# Patient Record
Sex: MALE
[De-identification: names, ages, dates, MRNs, and addresses within clinical notes are randomized per-mention and may not be internally consistent; named-entity substitution may affect disease eponyms.]

---

## 2020-12-10 ENCOUNTER — TRANSCRIPTION ENCOUNTER (OUTPATIENT)
Age: 25
End: 2020-12-10

## 2021-01-04 ENCOUNTER — TRANSCRIPTION ENCOUNTER (OUTPATIENT)
Age: 26
End: 2021-01-04

## 2021-04-08 ENCOUNTER — TRANSCRIPTION ENCOUNTER (OUTPATIENT)
Age: 26
End: 2021-04-08

## 2021-09-03 ENCOUNTER — TRANSCRIPTION ENCOUNTER (OUTPATIENT)
Age: 26
End: 2021-09-03

## 2024-02-09 ENCOUNTER — APPOINTMENT (OUTPATIENT)
Dept: ORTHOPEDIC SURGERY | Facility: CLINIC | Age: 29
End: 2024-02-09
Payer: COMMERCIAL

## 2024-02-09 VITALS — WEIGHT: 215 LBS | HEIGHT: 72 IN | BODY MASS INDEX: 29.12 KG/M2

## 2024-02-09 DIAGNOSIS — S73.101A UNSPECIFIED SPRAIN OF RIGHT HIP, INITIAL ENCOUNTER: ICD-10-CM

## 2024-02-09 DIAGNOSIS — Z83.3 FAMILY HISTORY OF DIABETES MELLITUS: ICD-10-CM

## 2024-02-09 DIAGNOSIS — Z78.9 OTHER SPECIFIED HEALTH STATUS: ICD-10-CM

## 2024-02-09 PROBLEM — Z00.00 ENCOUNTER FOR PREVENTIVE HEALTH EXAMINATION: Status: ACTIVE | Noted: 2024-02-09

## 2024-02-09 PROCEDURE — 73503 X-RAY EXAM HIP UNI 4/> VIEWS: CPT | Mod: RT

## 2024-02-09 PROCEDURE — 99204 OFFICE O/P NEW MOD 45 MIN: CPT

## 2024-02-09 PROCEDURE — 72100 X-RAY EXAM L-S SPINE 2/3 VWS: CPT

## 2024-02-09 RX ORDER — PIROXICAM 20 MG/1
20 CAPSULE ORAL
Qty: 30 | Refills: 4 | Status: ACTIVE | COMMUNITY
Start: 2024-02-09 | End: 1900-01-01

## 2024-02-09 NOTE — HISTORY OF PRESENT ILLNESS
[6] : 6 [0] : 0 [Dull/Aching] : dull/aching [Intermittent] : intermittent [Nothing helps with pain getting better] : Nothing helps with pain getting better [Sitting] : sitting [Full time] : Work status: full time [de-identified] : 2/9/24  Initial visit for this 29 year old male  complaining of spontaneous onset of rt sided hip and LBP x last 1 years duration. Worse sitting long periods and exercises such as squatting and leg press, and hip abduction. Denies a limp. Takes no nsaids. 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

## 2024-02-09 NOTE — PHYSICAL EXAM
[Normal Mood and Affect] : normal mood and affect [Able to Communicate] : able to communicate [Well Developed] : well developed [Well Nourished] : well nourished [NL (90)] : forward flexion 90 degrees [NL (30)] : right lateral bending 30 degrees [Right] : right hip [5___] : abduction 5[unfilled]/5 [de-identified] : athletic [Disc space narrowing] : Disc space narrowing [FreeTextEntry1] : slight DSN at L5-S1 only [] : non-antalgic [AP] : anteroposterior [Lateral] : lateral [There are no fractures, subluxations or dislocations. No significant abnormalities are seen] : There are no fractures, subluxations or dislocations. No significant abnormalities are seen [FreeTextEntry9] : has a OS acetabuli rt hip only

## 2024-02-09 NOTE — PLAN
[TextEntry] : The patient was advised of the diagnosis. The natural history of the pathology was explained in full to the patient in layman's terms. All questions were answered. The risks and benefits of surgical and non-surgical treatment alternatives were explained in full to the patient.  Patient is being referred for physical therapy for various modalities.  Start  Feldene daily  Will obtain an MRI rt hip

## 2024-02-12 ENCOUNTER — APPOINTMENT (OUTPATIENT)
Dept: MRI IMAGING | Facility: CLINIC | Age: 29
End: 2024-02-12
Payer: COMMERCIAL

## 2024-02-12 PROCEDURE — 73721 MRI JNT OF LWR EXTRE W/O DYE: CPT | Mod: RT

## 2024-02-15 ENCOUNTER — APPOINTMENT (OUTPATIENT)
Dept: ORTHOPEDIC SURGERY | Facility: CLINIC | Age: 29
End: 2024-02-15
Payer: COMMERCIAL

## 2024-02-15 VITALS — BODY MASS INDEX: 29.12 KG/M2 | WEIGHT: 215 LBS | HEIGHT: 72 IN

## 2024-02-15 DIAGNOSIS — M24.159 OTHER ARTICULAR CARTILAGE DISORDERS, UNSPECIFIED HIP: ICD-10-CM

## 2024-02-15 DIAGNOSIS — M16.11 UNILATERAL PRIMARY OSTEOARTHRITIS, RIGHT HIP: ICD-10-CM

## 2024-02-15 PROCEDURE — 99213 OFFICE O/P EST LOW 20 MIN: CPT

## 2024-02-15 NOTE — PHYSICAL EXAM
[Normal Mood and Affect] : normal mood and affect [Able to Communicate] : able to communicate [Well Developed] : well developed [Well Nourished] : well nourished [NL (90)] : forward flexion 90 degrees [NL (30)] : right lateral bending 30 degrees [Disc space narrowing] : Disc space narrowing [Right] : right hip [5___] : abduction 5[unfilled]/5 [AP] : anteroposterior [Lateral] : lateral [There are no fractures, subluxations or dislocations. No significant abnormalities are seen] : There are no fractures, subluxations or dislocations. No significant abnormalities are seen [de-identified] : athletic [FreeTextEntry1] : slight DSN at L5-S1 only [] : non-antalgic [FreeTextEntry9] : has a OS acetabuli rt hip only

## 2024-02-15 NOTE — PLAN
[TextEntry] : The patient was advised of the diagnosis. The natural history of the pathology was explained in full to the patient in layman's terms. All questions were answered. The risks and benefits of surgical and non-surgical treatment alternatives were explained in full to the patient.  Patient is being referred for physical therapy for various modalities.  Pt wishes to defer any surgical considerations for now.

## 2024-02-15 NOTE — HISTORY OF PRESENT ILLNESS
[6] : 6 [0] : 0 [Dull/Aching] : dull/aching [Intermittent] : intermittent [Nothing helps with pain getting better] : Nothing helps with pain getting better [Sitting] : sitting [Full time] : Work status: full time [de-identified] : 02/15/24:  Returns today for right hip MRI results.Still c/o some rt groin and rt LBP.  IMPRESSION:  RIGHT 1.  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 2.  Gluteal tendinopathy and low grade tear anterior greater trochanter with soft tissue edema and no bursitis.  2/9/24  Initial visit for this 29 year old male  complaining of spontaneous onset of rt sided hip and LBP x last 1 years duration. Worse sitting long periods and exercises such as squatting and leg press, and hip abduction. Denies a limp. Takes no nsaids. 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

## 2025-01-24 ENCOUNTER — EMERGENCY (EMERGENCY)
Facility: HOSPITAL | Age: 30
LOS: 1 days | Discharge: HOME | End: 2025-01-24
Attending: STUDENT IN AN ORGANIZED HEALTH CARE EDUCATION/TRAINING PROGRAM | Admitting: STUDENT IN AN ORGANIZED HEALTH CARE EDUCATION/TRAINING PROGRAM
Payer: COMMERCIAL

## 2025-01-24 VITALS
OXYGEN SATURATION: 98 % | DIASTOLIC BLOOD PRESSURE: 75 MMHG | HEIGHT: 71 IN | RESPIRATION RATE: 18 BRPM | WEIGHT: 205.03 LBS | SYSTOLIC BLOOD PRESSURE: 124 MMHG | HEART RATE: 73 BPM | TEMPERATURE: 99 F
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
## 2025-01-24 NOTE — ED ADULT NURSE NOTE - CHPI ED NUR SYMPTOMS NEG
no bruising/no decreased eating/drinking/no difficulty bearing weight/no disorientation/no dizziness/no headache/no laceration/no loss of consciousness/no neck tenderness/no sleeping issues

## 2025-01-24 NOTE — ED ADULT NURSE NOTE - NSFALLUNIVINTERV_ED_ALL_ED
160.02
Bed/Stretcher in lowest position, wheels locked, appropriate side rails in place/Call bell, personal items and telephone in reach/Instruct patient to call for assistance before getting out of bed/chair/stretcher/Non-slip footwear applied when patient is off stretcher/Reva to call system/Physically safe environment - no spills, clutter or unnecessary equipment/Purposeful proactive rounding/Room/bathroom lighting operational, light cord in reach

## 2025-01-24 NOTE — ED ADULT TRIAGE NOTE - WEIGHT IN LBS
"Subjective:      Patient ID: Vipin Garzon is a 22 y.o. male.    Vitals:  height is 5' 9" (1.753 m) and weight is 95.9 kg (211 lb 6.7 oz). His oral temperature is 98.2 °F (36.8 °C). His blood pressure is 134/84 and his pulse is 77. His respiration is 19 and oxygen saturation is 98%.     Chief Complaint: Rash    Patient is a 22-year-old male here for a rash to his hands, feet, face that started 2 days ago.  Has some mild nasal congestion.  Rash appears as small red dots that are painful and itchy. His daughter currently has hand-foot-mouth disease.  Denies fever, chills, headache, coughing, nausea, vomiting.    Rash  This is a new problem. Episode onset: 2 days ago. The problem is unchanged. The affected locations include the left hand, right hand, left foot, right foot and face. The rash is characterized by redness, itchiness, draining, blistering and pain. Associated with: child hand foot and mouth. Associated symptoms include congestion. Pertinent negatives include no cough, diarrhea, fever or vomiting. Past treatments include anti-itch cream. The treatment provided no relief.     Constitution: Negative for chills and fever.   HENT:  Positive for congestion.    Neck: Negative for neck pain.   Cardiovascular:  Negative for chest pain.   Respiratory:  Negative for cough.    Gastrointestinal:  Negative for abdominal pain, nausea, vomiting and diarrhea.   Musculoskeletal:  Negative for muscle ache.   Skin:  Positive for rash.   Allergic/Immunologic: Positive for itching. Negative for sneezing.   Neurological:  Negative for dizziness and headaches.      Objective:     Physical Exam   Constitutional: He is oriented to person, place, and time. He appears well-developed.   HENT:   Head: Normocephalic and atraumatic.   Ears:   Right Ear: External ear normal.   Left Ear: External ear normal.   Nose: Nose normal.   Mouth/Throat: Oropharynx is clear and moist. Mucous membranes are moist. Oropharynx is clear.   Eyes: " Conjunctivae, EOM and lids are normal. Pupils are equal, round, and reactive to light.   Neck: Trachea normal and phonation normal. Neck supple.   Musculoskeletal: Normal range of motion.         General: Normal range of motion.   Neurological: He is alert and oriented to person, place, and time.   Skin: Skin is warm, dry, intact and rash (Erythematous maculopapular rash to palms, soles, face).   Psychiatric: His speech is normal and behavior is normal. Judgment and thought content normal.   Nursing note and vitals reviewed.    Assessment:     1. Hand, foot and mouth disease (HFMD)        Plan:     Hand, foot and mouth disease (HFMD)  -     ibuprofen (ADVIL,MOTRIN) 600 MG tablet; Take 1 tablet (600 mg total) by mouth every 8 (eight) hours as needed for Pain.  Dispense: 20 tablet; Refill: 0            Patient Instructions   - Ibuprofen for pain    - Follow up with your PCP or specialty clinic as directed in the next 1-2 weeks if not improved or as needed.  You can call [phone] to schedule an appointment with the appropriate provider.    - Go to the ER or seek medical attention immediately if you develop new or worsening symptoms.    - You must understand that you have received an Urgent Care treatment only and that you may be released before all of your medical problems are known or treated.   - You, the patient, will arrange for follow up care as instructed.   - If your condition worsens or fails to improve we recommend that you receive another evaluation at the ER immediately or contact your PCP to discuss your concerns or return here.          205
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
## 2025-01-25 VITALS
RESPIRATION RATE: 17 BRPM | OXYGEN SATURATION: 97 % | SYSTOLIC BLOOD PRESSURE: 130 MMHG | TEMPERATURE: 98 F | HEART RATE: 72 BPM | DIASTOLIC BLOOD PRESSURE: 74 MMHG
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
## 2025-01-25 NOTE — ED PROVIDER NOTE - MUSCULOSKELETAL, MLM
Spine appears normal, range of motion is not limited, no muscle or joint tenderness. Right shoulder and left knee with full ROM, no ecchymosis, edema, deformity, or TTP.  5/5 UE and LE strength. Hips and pelvis stable, gait steady

## 2025-01-25 NOTE — ED PROVIDER NOTE - PATIENT PORTAL LINK FT
You can access the FollowMyHealth Patient Portal offered by [facility] by registering at the following website: [email or web address]. By joining [employer]’s FollowMyHealth portal, you will also be able to view your health information using other applications (apps) compatible with our system.
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
## 2025-01-25 NOTE — ED PROVIDER NOTE - DIFFERENTIAL DIAGNOSIS
Ddx includes but not limited to fracture right humeral, clavicle, left knee, left patella.  C-spine fracture, herniated disc, whiplash, contusion, hematoma, sprain, rib fracture, cardiac contusion, PTX, hemothorax Differential Diagnosis
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
## 2025-01-25 NOTE — ED PROVIDER NOTE - CARE PROVIDERS DIRECT ADDRESSES
[email or web address]
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
## 2025-01-25 NOTE — ED PROVIDER NOTE - CARE PROVIDER_API CALL
Sarath, [address], [facility] [place] [address]  Phone: [phone]  Fax: [phone]  Follow Up Time: